# Patient Record
Sex: FEMALE | Race: WHITE | NOT HISPANIC OR LATINO | Employment: FULL TIME | URBAN - METROPOLITAN AREA
[De-identification: names, ages, dates, MRNs, and addresses within clinical notes are randomized per-mention and may not be internally consistent; named-entity substitution may affect disease eponyms.]

---

## 2016-02-29 LAB
CHOLEST SERPL-MCNC: 210 MG/DL
GLUCOSE SERPL-MCNC: 88 MG/DL (ref 74–106)
HDLC SERPL-MCNC: 57 MG/DL
LDLC SERPL CALC-MCNC: 134 MG/DL
TRIGL SERPL-MCNC: 95 MG/DL

## 2018-03-02 ENCOUNTER — TRANSFERRED RECORDS (OUTPATIENT)
Dept: HEALTH INFORMATION MANAGEMENT | Facility: CLINIC | Age: 38
End: 2018-03-02

## 2018-03-02 LAB
CHOLEST SERPL-MCNC: 266 MG/DL
GLUCOSE SERPL-MCNC: 91 MG/DL (ref 50–100)
HDLC SERPL-MCNC: 52 MG/DL
LDLC SERPL CALC-MCNC: 195 MG/DL
TRIGL SERPL-MCNC: 95 MG/DL

## 2018-03-21 ENCOUNTER — OFFICE VISIT (OUTPATIENT)
Dept: INTERNAL MEDICINE | Facility: CLINIC | Age: 38
End: 2018-03-21
Payer: COMMERCIAL

## 2018-03-21 VITALS
BODY MASS INDEX: 27.56 KG/M2 | DIASTOLIC BLOOD PRESSURE: 70 MMHG | SYSTOLIC BLOOD PRESSURE: 90 MMHG | HEART RATE: 94 BPM | HEIGHT: 65 IN | TEMPERATURE: 98.2 F | OXYGEN SATURATION: 97 % | WEIGHT: 165.4 LBS

## 2018-03-21 DIAGNOSIS — G43.719 INTRACTABLE CHRONIC MIGRAINE WITHOUT AURA AND WITHOUT STATUS MIGRAINOSUS: ICD-10-CM

## 2018-03-21 DIAGNOSIS — E78.5 HYPERLIPIDEMIA LDL GOAL <130: Primary | ICD-10-CM

## 2018-03-21 DIAGNOSIS — K21.9 GASTROESOPHAGEAL REFLUX DISEASE WITHOUT ESOPHAGITIS: ICD-10-CM

## 2018-03-21 PROCEDURE — 36415 COLL VENOUS BLD VENIPUNCTURE: CPT | Performed by: INTERNAL MEDICINE

## 2018-03-21 PROCEDURE — 80061 LIPID PANEL: CPT | Performed by: INTERNAL MEDICINE

## 2018-03-21 PROCEDURE — 99214 OFFICE O/P EST MOD 30 MIN: CPT | Performed by: INTERNAL MEDICINE

## 2018-03-21 RX ORDER — SUMATRIPTAN 100 MG/1
100 TABLET, FILM COATED ORAL PRN
Qty: 30 TABLET | Refills: 0 | Status: SHIPPED | OUTPATIENT
Start: 2018-03-21 | End: 2023-12-11

## 2018-03-21 NOTE — MR AVS SNAPSHOT
After Visit Summary   3/21/2018    Chloe Magaña    MRN: [id number]           Patient Information     Date Of Birth          1980        Visit Information        Provider Department      3/21/2018 10:20 AM Deepali Gray MD [facility]        Today's Diagnoses     Hyperlipidemia LDL goal <130    -  1      Care Instructions    Nutritionist    Exercise 150 minutes per week.           Follow-ups after your visit        Additional Services     NUTRITION REFERRAL       Your provider has referred you to: [facility]: [facility] [phone]   [email or web address]    Please be aware that coverage of these services is subject to the terms and limitations of your health insurance plan.  Call member services at your health plan with any benefit or coverage questions.      Please bring the following with you to your appointment:    (1) This referral request  (2) Any documents given to you regarding this referral  (3) Any specific questions you have about diet and/or food choices                  Who to contact     If you have questions or need follow up information about today's clinic visit or your schedule please contact [facility] directly at [phone].  Normal or non-critical lab and imaging results will be communicated to you by MyChart, letter or phone within 4 business days after the clinic has received the results. If you do not hear from us within 7 days, please contact the clinic through Schedule Savvyhart or phone. If you have a critical or abnormal lab result, we will notify you by phone as soon as possible.  Submit refill requests through [facility] ([facility]) or call your pharmacy and they will forward the refill request to us. Please allow 3 business days for your refill to be completed.          Additional Information About Your Visit        Schedule Savvyhart Information     [facility] ([facility]) lets you send messages to your doctor, view your test  "results, renew your prescriptions, schedule appointments and more. To sign up, go to [email or web address] . Click on \"Log in\" on the left side of the screen, which will take you to the Welcome page. Then click on \"Sign up Now\" on the right side of the page.     You will be asked to enter the access code listed below, as well as some personal information. Please follow the directions to create your username and password.     Your access code is: [email or web address]  Expires: 2018 11:07 AM     Your access code will  in 90 days. If you need help or a new code, please call your [place] clinic or [phone].        Care EveryWhere ID     This is your Care EveryWhere ID. This could be used by other organizations to access your [place] medical records  [id number]        Your Vitals Were     Pulse Temperature Height Last Period Pulse Oximetry BMI (Body Mass Index)    94 98.2  F (36.8  C) (Oral) 5' 4.5\" (1.638 m) 2018 97% 27.95 kg/m2       Blood Pressure from Last 3 Encounters:   18 90/70    Weight from Last 3 Encounters:   18 165 lb 6.4 oz (75 kg)              We Performed the Following     NUTRITION REFERRAL        Primary Care Provider Office Phone # Fax #    Deepali Gray [phone]       [address] [place] [address]        Equal Access to Services     [facility] Hospital[facility]ENJI : Hadii aad ku hadasho Soomaali, waaxda luqadaha, qaybta kaalmada adeegyada, jessi alcaraz . So [facility] [phone].    ATENCIÓN: Si habla español, tiene a simon disposición servicios gratuitos de asistencia lingüística. Avani almazan [phone].    We comply with applicable federal civil rights laws and [place] laws. We do not discriminate on the basis of race, color, national origin, age, disability, sex, sexual orientation, or gender identity.            Thank you!     Thank you for choosing [facility]  for your care. Our goal is always to provide you " with excellent care. Hearing back from our patients is one way we can continue to improve our services. Please take a few minutes to complete the written survey that you may receive in the mail after your visit with us. Thank you!             Your Updated Medication List - Protect others around you: Learn how to safely use, store and throw away your medicines at www.disposemymeds.org.          This list is accurate as of 3/21/18 11:07 AM.  Always use your most recent med list.                   Brand Name Dispense Instructions for use Diagnosis    IMITREX PO      Take 100 mg by mouth as needed for migraine

## 2018-03-21 NOTE — NURSING NOTE
"Chief Complaint   Patient presents with     Establish Care       Initial BP 90/70 (Cuff Size: Adult Large)  Pulse 94  Temp 98.2  F (36.8  C) (Oral)  Ht 5' 4.5\" (1.638 m)  Wt 165 lb 6.4 oz (75 kg)  LMP 03/20/2018  SpO2 97%  BMI 27.95 kg/m2 Estimated body mass index is 27.95 kg/(m^2) as calculated from the following:    Height as of this encounter: 5' 4.5\" (1.638 m).    Weight as of this encounter: 165 lb 6.4 oz (75 kg).  Medication Reconciliation: complete    "

## 2018-03-21 NOTE — Clinical Note
Please abstract the following data from this visit with this patient into the appropriate field in Epic:  Pap smear done on this date: 03/02/18 (approximately), by this group: [facility], results were WNL.

## 2018-03-21 NOTE — PROGRESS NOTES
"Pap smear done on this date: 03/02/18 (approximately), by this group: [facility], results were WNL.      SUBJECTIVE:   Chloe Magaña is a 38 year old female who presents to clinic today for the following health issues:    Hyperlipidemia.    She reports her diet is not the best.  She does not exercise.   LDL cholesterol was 195mg/dL 3/2/18.     Migraines.  The patient does not have migraine without aura. She has migraines every 2 months around the time of her period.   She has an IUD.     Heartburn for 2-3 years.  She reports that it is not consistent.   She took prilosec every other day.   Past 1 month she has had it every other day.   Yesterday she had chest pain after dinner for 2 minutes.  She was sitting on the couch.       Problem list and histories reviewed & adjusted, as indicated.      Reviewed and updated as needed this visit by clinical staff  Allergies  Meds  Problems       Reviewed and updated as needed this visit by Provider         ROS:  CONSTITUTIONAL: NEGATIVE for fever, chills, change in weight  RESP: NEGATIVE for significant cough or SOB  CV: NEGATIVE for chest pain, palpitations or peripheral edema  GI: POS heartburn  Neuro: migraines    OBJECTIVE:     BP 90/70 (Cuff Size: Adult Large)  Pulse 94  Temp 98.2  F (36.8  C) (Oral)  Ht 5' 4.5\" (1.638 m)  Wt 165 lb 6.4 oz (75 kg)  LMP 03/20/2018  SpO2 97%  BMI 27.95 kg/m2  Body mass index is 27.95 kg/(m^2).  GENERAL: healthy, alert and no distress  RESP: lungs clear to auscultation - no rales, rhonchi or wheezes  CV: regular rate and rhythm, normal S1 S2, no S3 or S4, no murmur, click or rub  GI: no epigastric tenderness    ASSESSMENT/PLAN:       (E78.5) Hyperlipidemia LDL goal <130 (primary encounter diagnosis)  Comment: reassess  Plan: NUTRITION REFERRAL, Lipid panel reflex to         direct LDL Fasting            (G43.899) Intractable chronic migraine without aura and without status migrainosus    Comment:   Plan: SUMAtriptan (IMITREX) " 100 MG tablet            (K21.9) Gastroesophageal reflux disease without esophagitis  Comment:  Plan: counseled on diet        Deepali Gray MD  [facility]

## 2018-03-22 LAB
CHOLEST SERPL-MCNC: 215 MG/DL
HDLC SERPL-MCNC: 43 MG/DL
LDLC SERPL CALC-MCNC: 156 MG/DL
NONHDLC SERPL-MCNC: 172 MG/DL
TRIGL SERPL-MCNC: 81 MG/DL

## 2018-03-26 ENCOUNTER — HEALTH MAINTENANCE LETTER (OUTPATIENT)
Age: 38
End: 2018-03-26

## 2018-10-23 ENCOUNTER — TELEPHONE (OUTPATIENT)
Dept: INTERNAL MEDICINE | Facility: CLINIC | Age: 38
End: 2018-10-23

## 2018-10-23 NOTE — LETTER
[facility]  [address], [address]  [place], [place]  [address]                                            TEL:[phone]  FAX:[phone]      Chloe Magaña  [address] [address] [facility] [place] [address]      November 6, 2018    Dear Chloe,         We sent you a letter a couple of weeks ago informing you of health maintenance that is due. We hope that you received it. This letter is just a follow up to remind you to schedule an appointment.            Sincerely,      Deepali Gray M.D.

## 2018-10-23 NOTE — LETTER
[facility]  [address], [address]  [place], [place]  [address]                                            TEL:[phone]  FAX:[phone]      Chloe Magaña  [address] [address] [facility] [address] [facility] [place] [address]      October 23, 2018    Dear Chloe,         At [facility] we care about your health and well-being.  A review of your chart has indicated that you are due for a PAP screening.  Please contact us at [phone] to schedule an appointment.    If you have already had one or all of the above screening tests at another facility, please call us to update your chart.       Sincerely,      Deepali Gray M.D.

## 2018-10-23 NOTE — TELEPHONE ENCOUNTER
Panel Management Review      Patient has the following on her problem list:       IVD   ASA: MONITOR    Last LDL:    Lab Results   Component Value Date    CHOL 215 03/21/2018     Lab Results   Component Value Date    HDL 43 03/21/2018     Lab Results   Component Value Date     03/21/2018     Lab Results   Component Value Date    TRIG 81 03/21/2018      No results found for: CHOLHDLRATIO     Is the patient on a Statin? NO   Is the patient on Aspirin? NO                    Last three blood pressure readings:  BP Readings from Last 3 Encounters:   03/21/18 90/70        Tobacco History:     History   Smoking Status     Never Smoker   Smokeless Tobacco     Never Used         Composite cancer screening  Chart review shows that this patient is due/due soon for the following Pap Smear  Summary:    Patient is due/failing the following:   PAP    Action needed:   Patient needs office visit for PAP screening.    Type of outreach:    Sent letter.    Questions for provider review:    None                                                                                                                                    Valarie POLLACK       Chart routed to Valarie POLLACK

## 2019-03-08 ENCOUNTER — TRANSFERRED RECORDS (OUTPATIENT)
Dept: HEALTH INFORMATION MANAGEMENT | Facility: CLINIC | Age: 39
End: 2019-03-08

## 2019-03-08 LAB
CHOLEST SERPL-MCNC: 231 MG/DL
GLUCOSE SERPL-MCNC: 79 MG/DL (ref 50–100)
HDLC SERPL-MCNC: 55 MG/DL
LDLC SERPL CALC-MCNC: 166 MG/DL
TRIGL SERPL-MCNC: 50 MG/DL

## 2019-03-27 ENCOUNTER — OFFICE VISIT (OUTPATIENT)
Dept: INTERNAL MEDICINE | Facility: CLINIC | Age: 39
End: 2019-03-27
Payer: COMMERCIAL

## 2019-03-27 VITALS
OXYGEN SATURATION: 99 % | WEIGHT: 126.3 LBS | DIASTOLIC BLOOD PRESSURE: 62 MMHG | HEIGHT: 65 IN | TEMPERATURE: 97.8 F | BODY MASS INDEX: 21.04 KG/M2 | SYSTOLIC BLOOD PRESSURE: 104 MMHG | HEART RATE: 91 BPM

## 2019-03-27 DIAGNOSIS — Z00.00 ROUTINE GENERAL MEDICAL EXAMINATION AT A HEALTH CARE FACILITY: Primary | ICD-10-CM

## 2019-03-27 LAB
BASOPHILS # BLD AUTO: 0 10E9/L (ref 0–0.2)
BASOPHILS NFR BLD AUTO: 0.4 %
DIFFERENTIAL METHOD BLD: NORMAL
EOSINOPHIL # BLD AUTO: 0.1 10E9/L (ref 0–0.7)
EOSINOPHIL NFR BLD AUTO: 0.6 %
ERYTHROCYTE [DISTWIDTH] IN BLOOD BY AUTOMATED COUNT: 12.9 % (ref 10–15)
HCT VFR BLD AUTO: 42.8 % (ref 35–47)
HGB BLD-MCNC: 13.9 G/DL (ref 11.7–15.7)
LYMPHOCYTES # BLD AUTO: 2.5 10E9/L (ref 0.8–5.3)
LYMPHOCYTES NFR BLD AUTO: 31.6 %
MCH RBC QN AUTO: 31.1 PG (ref 26.5–33)
MCHC RBC AUTO-ENTMCNC: 32.5 G/DL (ref 31.5–36.5)
MCV RBC AUTO: 96 FL (ref 78–100)
MONOCYTES # BLD AUTO: 0.5 10E9/L (ref 0–1.3)
MONOCYTES NFR BLD AUTO: 6.4 %
NEUTROPHILS # BLD AUTO: 4.8 10E9/L (ref 1.6–8.3)
NEUTROPHILS NFR BLD AUTO: 61 %
PLATELET # BLD AUTO: 278 10E9/L (ref 150–450)
RBC # BLD AUTO: 4.47 10E12/L (ref 3.8–5.2)
WBC # BLD AUTO: 7.8 10E9/L (ref 4–11)

## 2019-03-27 PROCEDURE — 84443 ASSAY THYROID STIM HORMONE: CPT | Performed by: INTERNAL MEDICINE

## 2019-03-27 PROCEDURE — 36415 COLL VENOUS BLD VENIPUNCTURE: CPT | Performed by: INTERNAL MEDICINE

## 2019-03-27 PROCEDURE — 85025 COMPLETE CBC W/AUTO DIFF WBC: CPT | Performed by: INTERNAL MEDICINE

## 2019-03-27 PROCEDURE — 99395 PREV VISIT EST AGE 18-39: CPT | Performed by: INTERNAL MEDICINE

## 2019-03-27 PROCEDURE — 80053 COMPREHEN METABOLIC PANEL: CPT | Performed by: INTERNAL MEDICINE

## 2019-03-27 PROCEDURE — 80061 LIPID PANEL: CPT | Performed by: INTERNAL MEDICINE

## 2019-03-27 ASSESSMENT — ENCOUNTER SYMPTOMS
DIZZINESS: 0
WEAKNESS: 0
JOINT SWELLING: 0
EYE PAIN: 0
HEMATOCHEZIA: 0
PALPITATIONS: 0
ARTHRALGIAS: 0
SHORTNESS OF BREATH: 0
CONSTIPATION: 0
ABDOMINAL PAIN: 1
CHILLS: 0
DIARRHEA: 1
FREQUENCY: 0
DYSURIA: 0
NERVOUS/ANXIOUS: 0
HEADACHES: 0
FEVER: 0
MYALGIAS: 0
NAUSEA: 0
HEMATURIA: 0
BREAST MASS: 0
HEARTBURN: 0
PARESTHESIAS: 0
COUGH: 0
SORE THROAT: 0

## 2019-03-27 ASSESSMENT — MIFFLIN-ST. JEOR: SCORE: 1254.8

## 2019-03-27 NOTE — PROGRESS NOTES
SUBJECTIVE:   CC: Chloe Magaña is an 39 year old woman who presents for preventive health visit.     Physical   Annual:     Getting at least 3 servings of Calcium per day:  Yes    Bi-annual eye exam:  NO    Dental care twice a year:  Yes    Sleep apnea or symptoms of sleep apnea:  None    Diet:  Regular (no restrictions)    Frequency of exercise:  None    Taking medications regularly:  Not Applicable    Additional concerns today:  Yes    PHQ-2 Total Score: 0      .     Today's PHQ-2 Score:   PHQ-2 ( 1999 Pfizer) 3/27/2019   Q1: Little interest or pleasure in doing things 0   Q2: Feeling down, depressed or hopeless 0   PHQ-2 Score 0   Q1: Little interest or pleasure in doing things Not at all   Q2: Feeling down, depressed or hopeless Not at all   PHQ-2 Score 0       Abuse: Current or Past(Physical, Sexual or Emotional)- No  Do you feel safe in your environment? Yes    Social History     Tobacco Use     Smoking status: Never Smoker     Smokeless tobacco: Never Used   Substance Use Topics     Alcohol use: Yes     Comment: rarely     Alcohol Use 3/27/2019   If you drink alcohol do you typically have greater than 3 drinks per day OR greater than 7 drinks per week? No     Reviewed orders with patient.  Reviewed health maintenance and updated orders accordingly - Yes  Labs reviewed in EPIC    Mammogram not appropriate for this patient based on age.    Pertinent mammograms are reviewed under the imaging tab.  History of abnormal Pap smear: NO - age 30- 65 PAP every 3 years recommended     Reviewed and updated as needed this visit by clinical staff  Tobacco  Allergies  Meds  Fam Hx  Soc Hx        Reviewed and updated as needed this visit by Provider        Positive weight loss of 40 lbs- intentional with discontinuing soda and alcohol and decreasing sugar.  Also taking more stairs at work, etc.    Review of Systems   Constitutional: Negative for chills and fever.   HENT: Positive for congestion and ear pain.  "Negative for hearing loss and sore throat.    Eyes: Negative for pain and visual disturbance.   Respiratory: Negative for cough and shortness of breath.    Cardiovascular: Negative for chest pain, palpitations and peripheral edema.   Gastrointestinal: Positive for abdominal pain and diarrhea. Negative for constipation, heartburn, hematochezia and nausea.   Breasts:  Negative for tenderness, breast mass and discharge.   Genitourinary: Negative for dysuria, frequency, genital sores, hematuria, pelvic pain, urgency, vaginal bleeding and vaginal discharge.   Musculoskeletal: Negative for arthralgias, joint swelling and myalgias.   Skin: Negative for rash.   Neurological: Negative for dizziness, weakness, headaches and paresthesias.   Psychiatric/Behavioral: Negative for mood changes. The patient is not nervous/anxious.    pt plans to make separate appt if LUQ under rib pain does not improve. She plans on heat and ice and light stretches. Of note, she has a h/o kyphosis     OBJECTIVE:   /62   Pulse 91   Temp 97.8  F (36.6  C) (Oral)   Ht 1.645 m (5' 4.75\")   Wt 57.3 kg (126 lb 4.8 oz)   SpO2 99%   BMI 21.18 kg/m       Physical Exam  GENERAL APPEARANCE: healthy, alert and no distress  EYES: Eyes grossly normal to inspection, PERRL and conjunctivae and sclerae normal  HENT: ear canals and TM's normal, nose and mouth without ulcers or lesions, oropharynx clear and oral mucous membranes moist  NECK: no adenopathy, no asymmetry, masses, or scars and thyroid normal to palpation  RESP: lungs clear to auscultation - no rales, rhonchi or wheezes  BREAST: normal without masses, tenderness or nipple discharge and no palpable axillary masses or adenopathy  CV: regular rate and rhythm, normal S1 S2, no S3 or S4, no murmur, click or rub, no peripheral edema and peripheral pulses strong  ABDOMEN: soft, tenderness in left upper quadrant without guarding or rebound, no hepatosplenomegaly, no masses and bowel sounds " "normal  MS: no musculoskeletal defects are noted and gait is age appropriate without ataxia  SKIN: no suspicious lesions or rashes  NEURO: Normal strength and tone, sensory exam grossly normal, mentation intact and speech normal  PSYCH: mentation appears normal and affect normal/bright        ASSESSMENT/PLAN:       ICD-10-CM    1. Routine general medical examination at a health care facility Z00.00 TSH with free T4 reflex     Comprehensive metabolic panel     Lipid panel reflex to direct LDL Fasting     CBC with platelets differential       COUNSELING:  Reviewed preventive health counseling, as reflected in patient instructions       Regular exercise       Healthy diet/nutrition    BP Readings from Last 1 Encounters:   03/27/19 104/62     Estimated body mass index is 21.18 kg/m  as calculated from the following:    Height as of this encounter: 1.645 m (5' 4.75\").    Weight as of this encounter: 57.3 kg (126 lb 4.8 oz).           reports that  has never smoked. she has never used smokeless tobacco.      Counseling Resources:  ATP IV Guidelines  Pooled Cohorts Equation Calculator  Breast Cancer Risk Calculator  FRAX Risk Assessment  ICSI Preventive Guidelines  Dietary Guidelines for Americans, 2010  USDA's MyPlate  ASA Prophylaxis  Lung CA Screening    Deepali Gray MD  [facility]  "

## 2019-03-27 NOTE — LETTER
March 29, 2019      Chloe Magaña  [address] [address] [facility] [place] [address]        Dear ,    We are writing to inform you of your test results.    The cholesterol level LDL is elevated.     Recommendations are for a healthy weight (BMI of 20-25), healthy diet (please let us know if you would like to see a nutritionist), and exercise (150 minutes per week).     The kidney function (GFR) and liver function tests (AST and ALT) are within normal limits.     The fasting glucose is within normal limits.   The thyroid test (TSH) is within normal limits.   The blood counts (CBC) are within normal limits.     Resulted Orders   TSH with free T4 reflex   Result Value Ref Range    TSH 1.40 0.40 - 4.00 mU/L   Comprehensive metabolic panel   Result Value Ref Range    Sodium 140 133 - 144 mmol/L    Potassium 4.5 3.4 - 5.3 mmol/L    Chloride 110 (H) 94 - 109 mmol/L    Carbon Dioxide 24 20 - 32 mmol/L    Anion Gap 6 3 - 14 mmol/L    Glucose 89 70 - 99 mg/dL      Comment:      Fasting specimen    Urea Nitrogen 11 7 - 30 mg/dL    Creatinine 0.60 0.52 - 1.04 mg/dL    GFR Estimate >90 >60 mL/min/[1.73_m2]      Comment:      Non  GFR Calc  Starting 12/18/2018, serum creatinine based estimated GFR (eGFR) will be   calculated using the Chronic Kidney Disease Epidemiology Collaboration   (CKD-EPI) equation.      GFR Estimate If Black >90 >60 mL/min/[1.73_m2]      Comment:       GFR Calc  Starting 12/18/2018, serum creatinine based estimated GFR (eGFR) will be   calculated using the Chronic Kidney Disease Epidemiology Collaboration   (CKD-EPI) equation.      Calcium 9.4 8.5 - 10.1 mg/dL    Bilirubin Total 0.5 0.2 - 1.3 mg/dL    Albumin 4.3 3.4 - 5.0 g/dL    Protein Total 7.8 6.8 - 8.8 g/dL    Alkaline Phosphatase 68 40 - 150 U/L    ALT 17 0 - 50 U/L    AST 14 0 - 45 U/L   Lipid panel reflex to direct LDL Fasting   Result Value Ref Range    Cholesterol 209 (H) <200 mg/dL      Comment:      Desirable:        <200 mg/dl    Triglycerides 57 <150 mg/dL      Comment:      Fasting specimen    HDL Cholesterol 48 (L) >49 mg/dL    LDL Cholesterol Calculated 150 (H) <100 mg/dL      Comment:      Above desirable:  100-129 mg/dl  Borderline High:  130-159 mg/dL  High:             160-189 mg/dL  Very high:       >189 mg/dl      Non HDL Cholesterol 161 (H) <130 mg/dL      Comment:      Above Desirable:  130-159 mg/dl  Borderline high:  160-189 mg/dl  High:             190-219 mg/dl  Very high:       >219 mg/dl     CBC with platelets differential   Result Value Ref Range    WBC 7.8 4.0 - 11.0 10e9/L    RBC Count 4.47 3.8 - 5.2 10e12/L    Hemoglobin 13.9 11.7 - 15.7 g/dL    Hematocrit 42.8 35.0 - 47.0 %    MCV 96 78 - 100 fl    MCH 31.1 26.5 - 33.0 pg    MCHC 32.5 31.5 - 36.5 g/dL    RDW 12.9 10.0 - 15.0 %    Platelet Count 278 150 - 450 10e9/L    % Neutrophils 61.0 %    % Lymphocytes 31.6 %    % Monocytes 6.4 %    % Eosinophils 0.6 %    % Basophils 0.4 %    Absolute Neutrophil 4.8 1.6 - 8.3 10e9/L    Absolute Lymphocytes 2.5 0.8 - 5.3 10e9/L    Absolute Monocytes 0.5 0.0 - 1.3 10e9/L    Absolute Eosinophils 0.1 0.0 - 0.7 10e9/L    Absolute Basophils 0.0 0.0 - 0.2 10e9/L    Diff Method Automated Method        If you have any questions or concerns, please call the clinic at the number listed above.       Sincerely,        Deepali Gray MD

## 2019-03-27 NOTE — NURSING NOTE
"/62   Pulse 91   Temp 97.8  F (36.6  C) (Oral)   Ht 1.645 m (5' 4.75\")   Wt 57.3 kg (126 lb 4.8 oz)   SpO2 99%   BMI 21.18 kg/m    Britany Benavides MA on 3/27/2019 at 9:02 AM    "

## 2019-03-28 LAB
ALBUMIN SERPL-MCNC: 4.3 G/DL (ref 3.4–5)
ALP SERPL-CCNC: 68 U/L (ref 40–150)
ALT SERPL W P-5'-P-CCNC: 17 U/L (ref 0–50)
ANION GAP SERPL CALCULATED.3IONS-SCNC: 6 MMOL/L (ref 3–14)
AST SERPL W P-5'-P-CCNC: 14 U/L (ref 0–45)
BILIRUB SERPL-MCNC: 0.5 MG/DL (ref 0.2–1.3)
BUN SERPL-MCNC: 11 MG/DL (ref 7–30)
CALCIUM SERPL-MCNC: 9.4 MG/DL (ref 8.5–10.1)
CHLORIDE SERPL-SCNC: 110 MMOL/L (ref 94–109)
CHOLEST SERPL-MCNC: 209 MG/DL
CO2 SERPL-SCNC: 24 MMOL/L (ref 20–32)
CREAT SERPL-MCNC: 0.6 MG/DL (ref 0.52–1.04)
GFR SERPL CREATININE-BSD FRML MDRD: >90 ML/MIN/{1.73_M2}
GLUCOSE SERPL-MCNC: 89 MG/DL (ref 70–99)
HDLC SERPL-MCNC: 48 MG/DL
LDLC SERPL CALC-MCNC: 150 MG/DL
NONHDLC SERPL-MCNC: 161 MG/DL
POTASSIUM SERPL-SCNC: 4.5 MMOL/L (ref 3.4–5.3)
PROT SERPL-MCNC: 7.8 G/DL (ref 6.8–8.8)
SODIUM SERPL-SCNC: 140 MMOL/L (ref 133–144)
TRIGL SERPL-MCNC: 57 MG/DL
TSH SERPL DL<=0.005 MIU/L-ACNC: 1.4 MU/L (ref 0.4–4)

## 2019-04-01 ENCOUNTER — TELEPHONE (OUTPATIENT)
Dept: INTERNAL MEDICINE | Facility: CLINIC | Age: 39
End: 2019-04-01

## 2019-04-01 NOTE — TELEPHONE ENCOUNTER
Letter mailed to patient on 3/29/19.   Called patient, informed her letter was mailed with results and reviewed results with patient. Patient verbalizes understanding, she has been working on healthy diet, weight loss and exercise - lost 40 lbs in the last year. No further questions.

## 2019-04-04 ENCOUNTER — TRANSFERRED RECORDS (OUTPATIENT)
Dept: HEALTH INFORMATION MANAGEMENT | Facility: CLINIC | Age: 39
End: 2019-04-04

## 2020-03-11 ENCOUNTER — HEALTH MAINTENANCE LETTER (OUTPATIENT)
Age: 40
End: 2020-03-11

## 2020-05-29 ENCOUNTER — TRANSFERRED RECORDS (OUTPATIENT)
Dept: HEALTH INFORMATION MANAGEMENT | Facility: CLINIC | Age: 40
End: 2020-05-29

## 2020-05-29 LAB
CHOLEST SERPL-MCNC: 255 MG/DL
HDLC SERPL-MCNC: 66 MG/DL
LDLC SERPL CALC-MCNC: 177 MG/DL
TRIGL SERPL-MCNC: 62 MG/DL

## 2020-06-26 ENCOUNTER — TRANSFERRED RECORDS (OUTPATIENT)
Dept: HEALTH INFORMATION MANAGEMENT | Facility: CLINIC | Age: 40
End: 2020-06-26

## 2021-01-03 ENCOUNTER — HEALTH MAINTENANCE LETTER (OUTPATIENT)
Age: 41
End: 2021-01-03

## 2021-02-02 ENCOUNTER — TRANSFERRED RECORDS (OUTPATIENT)
Dept: HEALTH INFORMATION MANAGEMENT | Facility: CLINIC | Age: 41
End: 2021-02-02

## 2021-10-10 ENCOUNTER — HEALTH MAINTENANCE LETTER (OUTPATIENT)
Age: 41
End: 2021-10-10

## 2022-01-28 ENCOUNTER — TRANSFERRED RECORDS (OUTPATIENT)
Dept: HEALTH INFORMATION MANAGEMENT | Facility: CLINIC | Age: 42
End: 2022-01-28
Payer: COMMERCIAL

## 2022-01-29 ENCOUNTER — HEALTH MAINTENANCE LETTER (OUTPATIENT)
Age: 42
End: 2022-01-29

## 2022-02-08 ENCOUNTER — TRANSFERRED RECORDS (OUTPATIENT)
Dept: HEALTH INFORMATION MANAGEMENT | Facility: CLINIC | Age: 42
End: 2022-02-08
Payer: COMMERCIAL

## 2022-02-23 ENCOUNTER — HOSPITAL ENCOUNTER (OUTPATIENT)
Dept: MAMMOGRAPHY | Facility: CLINIC | Age: 42
Discharge: HOME | End: 2022-02-23
Attending: OBSTETRICS & GYNECOLOGY | Admitting: OBSTETRICS & GYNECOLOGY
Payer: COMMERCIAL

## 2022-02-23 DIAGNOSIS — Z12.31 VISIT FOR SCREENING MAMMOGRAM: ICD-10-CM

## 2022-02-23 PROCEDURE — 77067 SCR MAMMO BI INCL CAD: CPT

## 2022-09-18 ENCOUNTER — HEALTH MAINTENANCE LETTER (OUTPATIENT)
Age: 42
End: 2022-09-18

## 2023-02-07 ENCOUNTER — MEDICAL CORRESPONDENCE (OUTPATIENT)
Dept: HEALTH INFORMATION MANAGEMENT | Facility: CLINIC | Age: 43
End: 2023-02-07
Payer: COMMERCIAL

## 2023-02-07 ENCOUNTER — TRANSFERRED RECORDS (OUTPATIENT)
Dept: HEALTH INFORMATION MANAGEMENT | Facility: CLINIC | Age: 43
End: 2023-02-07
Payer: COMMERCIAL

## 2023-02-08 ENCOUNTER — TRANSCRIBE ORDERS (OUTPATIENT)
Dept: OTHER | Age: 43
End: 2023-02-08

## 2023-02-08 DIAGNOSIS — G43.829 MENSTRUAL MIGRAINE, NOT INTRACTABLE, WITHOUT STATUS MIGRAINOSUS: Primary | ICD-10-CM

## 2023-05-03 ENCOUNTER — LAB REQUISITION (OUTPATIENT)
Dept: LAB | Facility: CLINIC | Age: 43
End: 2023-05-03
Payer: COMMERCIAL

## 2023-05-03 DIAGNOSIS — R63.5 ABNORMAL WEIGHT GAIN: ICD-10-CM

## 2023-05-03 DIAGNOSIS — Z13.1 ENCOUNTER FOR SCREENING FOR DIABETES MELLITUS: ICD-10-CM

## 2023-05-03 DIAGNOSIS — Z13.220 ENCOUNTER FOR SCREENING FOR LIPOID DISORDERS: ICD-10-CM

## 2023-05-03 LAB
CHOLEST SERPL-MCNC: 252 MG/DL
FASTING STATUS PATIENT QL REPORTED: YES
GLUCOSE SERPL-MCNC: 92 MG/DL (ref 70–99)
HDLC SERPL-MCNC: 57 MG/DL
LDLC SERPL CALC-MCNC: 178 MG/DL
NONHDLC SERPL-MCNC: 195 MG/DL
TRIGL SERPL-MCNC: 86 MG/DL
TSH SERPL DL<=0.005 MIU/L-ACNC: 1.78 UIU/ML (ref 0.3–4.2)

## 2023-05-03 PROCEDURE — 80061 LIPID PANEL: CPT | Mod: ORL | Performed by: OBSTETRICS & GYNECOLOGY

## 2023-05-03 PROCEDURE — 84443 ASSAY THYROID STIM HORMONE: CPT | Mod: ORL | Performed by: OBSTETRICS & GYNECOLOGY

## 2023-05-03 PROCEDURE — 82947 ASSAY GLUCOSE BLOOD QUANT: CPT | Mod: ORL | Performed by: OBSTETRICS & GYNECOLOGY

## 2023-05-06 ENCOUNTER — HEALTH MAINTENANCE LETTER (OUTPATIENT)
Age: 43
End: 2023-05-06

## 2023-06-29 ASSESSMENT — HEADACHE IMPACT TEST (HIT 6)
HOW OFTEN HAVE YOU FELT FED UP OR IRRITATED BECAUSE OF YOUR HEADACHES: SOMETIMES
HOW OFTEN HAVE YOU FELT TOO TIRED TO WORK BECAUSE OF YOUR HEADACHES: SOMETIMES
WHEN YOU HAVE HEADACHES HOW OFTEN IS THE PAIN SEVERE: ALWAYS
HIT6 TOTAL SCORE: 64
HOW OFTEN DO HEADACHES LIMIT YOUR DAILY ACTIVITIES: SOMETIMES
WHEN YOU HAVE A HEADACHE HOW OFTEN DO YOU WISH YOU COULD LIE DOWN: VERY OFTEN
HOW OFTEN DID HEADACHS LIMIT CONCENTRATION ON WORK OR DAILY ACTIVITY: SOMETIMES

## 2023-07-06 ENCOUNTER — VIRTUAL VISIT (OUTPATIENT)
Dept: NEUROLOGY | Facility: CLINIC | Age: 43
End: 2023-07-06
Attending: OBSTETRICS & GYNECOLOGY
Payer: COMMERCIAL

## 2023-07-06 VITALS — BODY MASS INDEX: 25.61 KG/M2 | WEIGHT: 150 LBS | HEIGHT: 64 IN

## 2023-07-06 DIAGNOSIS — G43.829 MENSTRUAL MIGRAINE, NOT INTRACTABLE, WITHOUT STATUS MIGRAINOSUS: ICD-10-CM

## 2023-07-06 PROCEDURE — 99204 OFFICE O/P NEW MOD 45 MIN: CPT | Mod: VID | Performed by: PSYCHIATRY & NEUROLOGY

## 2023-07-06 RX ORDER — NARATRIPTAN 2.5 MG/1
2.5 TABLET ORAL
Qty: 18 TABLET | Refills: 11 | Status: SHIPPED | OUTPATIENT
Start: 2023-07-06 | End: 2023-12-11

## 2023-07-06 RX ORDER — NARATRIPTAN 2.5 MG/1
2.5 TABLET ORAL
Qty: 18 TABLET | Refills: 11 | Status: SHIPPED | OUTPATIENT
Start: 2023-07-06 | End: 2023-07-06

## 2023-07-06 ASSESSMENT — PAIN SCALES - GENERAL: PAINLEVEL: NO PAIN (0)

## 2023-07-06 NOTE — PROGRESS NOTES
Virtual Visit Details    Type of service:  Video Visit   Video Start Time: 2:05 PM  Video End Time:2:49 PM    Originating Location (pt. Location): Home  Distant Location (provider location):  On-site  Platform used for Video Visit: [facility]   Virtual Headache Neurology Consult  July 6, 2023     Chloe Magaña MRN# [id number]   YOB: 1980 Age: 43 year old     Requesting physician: Melinda Bruner MD         Assessment and Recommendations:     Chloe Magaña is a 43 year old female who presents for further evaluation of headache.    Her headache presentation meets criteria for episodic migraine without aura, possible menstrual migraine.  I suspect she has this on a genetic basis.  I did not appreciate any red flags for secondary causes of headache today.    Her virtual neurologic examination is intact.    We discussed several symptomatic treatment strategies, including continuing acute management only, trialing a week of preventative naratriptan, or starting a new preventative treatment.  -She will continue to monitor her headaches, including relationship to her menstrual cycle.  -I recommend naratriptan 2.5 mg taken at the onset of headache, with repeat dose in 4 hours if needed.  This should not exceed more than 9 days/month to avoid medication overuse.  She may take this daily during 1 week/month, during her menstrual period.  -For acute treatment of mild headache, she will continue naproxen 440 mg twice daily as needed.  This should not exceed more than 14 days/month to avoid medication overuse.    I will plan to see her back in 3 to 6 months to monitor progress.    Catalina Strickland MD  Neurology            Chief Complaint:     Chief Complaint   Patient presents with     Consult     Neurology Video Visit New, Menstrual migraine, not intractable, without status migrainosus           History is obtained from the patient and medical record.  Patient was seen via a virtual visit in their  home.      Chloe Magaña is a 43 year old female who has been living with headaches since 15 years ago. They have worsened over the past 1-2 years.    She has left periorbital swelling and frontal pain. This spreads to include more of her head now. It feels like a hard squeeze, throbbing. It rates 8/10. An attack lasts over 4 hours.    She has 5-8/30 headache days per month, with 3-4/30 severe headache days per month.    She has associated photophobia, phonophobia, nausea, rare vomiting.    She denies typical aura. She denies a positional component to her pain. She has unilateral autonomic features, with left eye swelling, tearing at times. She denies fevers.    Triggers include menstrual cycles (typically a bad week starting 2 days before or after period).     She has an IUD. She's had this for a few years. She tried continuous birth control previously.    She takes naproxen 440 mg for mild headache.   She takes naratriptan, which helps.   Laying down with a wash cloth over her eyes and sleeping helps.    Sumatriptan in the past - side effect of throat side effect            Past Medical History:     Past Medical History:   Diagnosis Date     Common migraine    Frontal fibrosis alopecia   Hyperlipidemia   Kyphosis - previous back brace in high school          Past Surgical History:   [place] teeth          Social History:   She is working full time as an . She is not missing work due to migraine.    She denies caffeine.    Social History     Socioeconomic History     Marital status:      Spouse name: Not on file     Number of children: Not on file     Years of education: Not on file     Highest education level: Not on file   Occupational History     Not on file   Tobacco Use     Smoking status: Never     Smokeless tobacco: Never   Substance and Sexual Activity     Alcohol use: Yes     Comment: rarely     Drug use: No     Sexual activity: Yes     Partners: Male     Birth  "control/protection: I.U.D.   Other Topics Concern     Parent/sibling w/ CABG, MI or angioplasty before 65F 55M? Not Asked   Social History Narrative     Not on file     Social Determinants of Health     Financial Resource Strain: Not on file   Food Insecurity: Not on file   Transportation Needs: Not on file   Physical Activity: Not on file   Stress: Not on file   Social Connections: Not on file   Intimate Partner Violence: Not on file   Housing Stability: Not on file             Family History:   Dad with migraine.    Family History   Problem Relation Age of Onset     Family History Negative Mother      Family History Negative Father      Ovarian Cancer Maternal Grandmother      Breast Cancer No family hx of              Allergies:    No Known Allergies          Medications:     Current Outpatient Medications:      SUMAtriptan (IMITREX) 100 MG tablet, Take 1 tablet (100 mg) by mouth as needed for migraine (Patient not taking: Reported on 3/27/2019), Disp: 30 tablet, Rfl: 0          Physical Exam:   Ht 1.632 m (5' 4.25\")   Wt 68 kg (150 lb)   BMI 25.55 kg/m     Physical Exam:   General: NAD  Neurologic:   Mental Status Exam: Alert, awake and oriented to situation. No dysarthria. Speech of normal fluency.   Cranial Nerves: Pupils equal, EOMs intact, no nystagmus, facial movements symmetric, hearing intact to conversation, tongue midline and fully mobile. No tongue atrophy or fasciculations.    Motor: No drift in upper extremities. Able to stand from a seated position without use of arms. No tremors or abnormal movements noted.   Coordination: With arms outstretched, able to touch nose using index finger accurately bilaterally.  Normal finger tapping bilaterally.     Gait: Normal stance and casual gait.    Neck: Normal range of motion with lateral head movements and neck flexion.  Eyes: No conjunctival injection, no scleral icterus.           Data:     No previous head imaging.    Up to date on eye exams.    "

## 2023-07-06 NOTE — NURSING NOTE
Is the patient currently in the state of [place]? YES    Visit mode:VIDEO    If the visit is dropped, the patient can be reconnected by: VIDEO VISIT: Send to e-mail at: [email or web address]    Will anyone else be joining the visit? NO      How would you like to obtain your AVS? MyChart    Are changes needed to the allergy or medication list? NO  Patient verified medications and allergies are correct via eCheck-in. Patient confirms no changes at this time and/or since last reviewed by clinic staff.    Reason for visit: Consult (Neurology Video Visit New, Menstrual migraine, not intractable, without status migrainosus)

## 2023-07-06 NOTE — LETTER
7/6/2023         RE: Chloe Magaña  [address] [address] [facility] [place] [address]        Dear Colleague,    Thank you for referring your patient, Chloe Magaña, to the [facility]. Please see a copy of my visit note below.    Virtual Visit Details    Type of service:  Video Visit   Video Start Time: 2:05 PM  Video End Time:2:49 PM    Originating Location (pt. Location): Home  Distant Location (provider location):  On-site  Platform used for Video Visit: [facility]   Virtual Headache Neurology Consult  July 6, 2023     Chloe Magaña MRN# [id number]   YOB: 1980 Age: 43 year old     Requesting physician: Melinda Bruner MD         Assessment and Recommendations:     Chloe Magaña is a 43 year old female who presents for further evaluation of headache.    Her headache presentation meets criteria for episodic migraine without aura, possible menstrual migraine.  I suspect she has this on a genetic basis.  I did not appreciate any red flags for secondary causes of headache today.    Her virtual neurologic examination is intact.    We discussed several symptomatic treatment strategies, including continuing acute management only, trialing a week of preventative naratriptan, or starting a new preventative treatment.  -She will continue to monitor her headaches, including relationship to her menstrual cycle.  -I recommend naratriptan 2.5 mg taken at the onset of headache, with repeat dose in 4 hours if needed.  This should not exceed more than 9 days/month to avoid medication overuse.  She may take this daily during 1 week/month, during her menstrual period.  -For acute treatment of mild headache, she will continue naproxen 440 mg twice daily as needed.  This should not exceed more than 14 days/month to avoid medication overuse.    I will plan to see her back in 3 to 6 months to monitor progress.    Catalina Strickland MD  Neurology            Chief Complaint:      Chief Complaint   Patient presents with     Consult     Neurology Video Visit New, Menstrual migraine, not intractable, without status migrainosus           History is obtained from the patient and medical record.  Patient was seen via a virtual visit in their home.      Chloe Magaña is a 43 year old female who has been living with headaches since 15 years ago. They have worsened over the past 1-2 years.    She has left periorbital swelling and frontal pain. This spreads to include more of her head now. It feels like a hard squeeze, throbbing. It rates 8/10. An attack lasts over 4 hours.    She has 5-8/30 headache days per month, with 3-4/30 severe headache days per month.    She has associated photophobia, phonophobia, nausea, rare vomiting.    She denies typical aura. She denies a positional component to her pain. She has unilateral autonomic features, with left eye swelling, tearing at times. She denies fevers.    Triggers include menstrual cycles (typically a bad week starting 2 days before or after period).     She has an IUD. She's had this for a few years. She tried continuous birth control previously.    She takes naproxen 440 mg for mild headache.   She takes naratriptan, which helps.   Laying down with a wash cloth over her eyes and sleeping helps.    Sumatriptan in the past - side effect of throat side effect            Past Medical History:     Past Medical History:   Diagnosis Date     Common migraine    Frontal fibrosis alopecia   Hyperlipidemia   Kyphosis - previous back brace in high school          Past Surgical History:   [place] teeth          Social History:   She is working full time as an . She is not missing work due to migraine.    She denies caffeine.    Social History     Socioeconomic History     Marital status:      Spouse name: Not on file     Number of children: Not on file     Years of education: Not on file     Highest education level: Not on file  "  Occupational History     Not on file   Tobacco Use     Smoking status: Never     Smokeless tobacco: Never   Substance and Sexual Activity     Alcohol use: Yes     Comment: rarely     Drug use: No     Sexual activity: Yes     Partners: Male     Birth control/protection: I.U.D.   Other Topics Concern     Parent/sibling w/ CABG, MI or angioplasty before 65F 55M? Not Asked   Social History Narrative     Not on file     Social Determinants of Health     Financial Resource Strain: Not on file   Food Insecurity: Not on file   Transportation Needs: Not on file   Physical Activity: Not on file   Stress: Not on file   Social Connections: Not on file   Intimate Partner Violence: Not on file   Housing Stability: Not on file             Family History:   Dad with migraine.    Family History   Problem Relation Age of Onset     Family History Negative Mother      Family History Negative Father      Ovarian Cancer Maternal Grandmother      Breast Cancer No family hx of              Allergies:    No Known Allergies          Medications:     Current Outpatient Medications:      SUMAtriptan (IMITREX) 100 MG tablet, Take 1 tablet (100 mg) by mouth as needed for migraine (Patient not taking: Reported on 3/27/2019), Disp: 30 tablet, Rfl: 0          Physical Exam:   Ht 1.632 m (5' 4.25\")   Wt 68 kg (150 lb)   BMI 25.55 kg/m     Physical Exam:   General: NAD  Neurologic:   Mental Status Exam: Alert, awake and oriented to situation. No dysarthria. Speech of normal fluency.   Cranial Nerves: Pupils equal, EOMs intact, no nystagmus, facial movements symmetric, hearing intact to conversation, tongue midline and fully mobile. No tongue atrophy or fasciculations.    Motor: No drift in upper extremities. Able to stand from a seated position without use of arms. No tremors or abnormal movements noted.   Coordination: With arms outstretched, able to touch nose using index finger accurately bilaterally.  Normal finger tapping bilaterally.  "    Gait: Normal stance and casual gait.    Neck: Normal range of motion with lateral head movements and neck flexion.  Eyes: No conjunctival injection, no scleral icterus.           Data:     No previous head imaging.    Up to date on eye exams.        Again, thank you for allowing me to participate in the care of your patient.        Sincerely,        Catalina Strickland MD

## 2023-12-08 ASSESSMENT — HEADACHE IMPACT TEST (HIT 6)
HIT6 TOTAL SCORE: 58
HOW OFTEN HAVE YOU FELT TOO TIRED TO WORK BECAUSE OF YOUR HEADACHES: RARELY
HOW OFTEN DID HEADACHS LIMIT CONCENTRATION ON WORK OR DAILY ACTIVITY: SOMETIMES
WHEN YOU HAVE A HEADACHE HOW OFTEN DO YOU WISH YOU COULD LIE DOWN: VERY OFTEN
WHEN YOU HAVE HEADACHES HOW OFTEN IS THE PAIN SEVERE: VERY OFTEN
HOW OFTEN HAVE YOU FELT FED UP OR IRRITATED BECAUSE OF YOUR HEADACHES: RARELY
HOW OFTEN DO HEADACHES LIMIT YOUR DAILY ACTIVITIES: SOMETIMES

## 2023-12-11 ENCOUNTER — OFFICE VISIT (OUTPATIENT)
Dept: NEUROLOGY | Facility: CLINIC | Age: 43
End: 2023-12-11
Payer: COMMERCIAL

## 2023-12-11 VITALS
HEART RATE: 85 BPM | SYSTOLIC BLOOD PRESSURE: 121 MMHG | WEIGHT: 162.4 LBS | OXYGEN SATURATION: 100 % | BODY MASS INDEX: 27.66 KG/M2 | DIASTOLIC BLOOD PRESSURE: 83 MMHG

## 2023-12-11 DIAGNOSIS — G43.829 MENSTRUAL MIGRAINE, NOT INTRACTABLE, WITHOUT STATUS MIGRAINOSUS: ICD-10-CM

## 2023-12-11 PROCEDURE — 99215 OFFICE O/P EST HI 40 MIN: CPT | Performed by: PSYCHIATRY & NEUROLOGY

## 2023-12-11 RX ORDER — NARATRIPTAN 2.5 MG/1
2.5 TABLET ORAL
Qty: 18 TABLET | Refills: 11 | Status: SHIPPED | OUTPATIENT
Start: 2023-12-11

## 2023-12-11 ASSESSMENT — PAIN SCALES - GENERAL: PAINLEVEL: MILD PAIN (2)

## 2023-12-11 NOTE — PROGRESS NOTES
[facility]    Headache Neurology Progress Note  December 11, 2023    Subjective:    Chloe Magaña returns for follow up of episodic migraine without aura, possible menstrual migraine.     In September, she took naratriptan x 5 days in a row. She found they helpful on the days she took them. She is having migraine days than previous years, she reports.    She reports an increase in headaches overall. Previously, they were more associated with menstrual cycles, now can happen other times too.   -She recorded 5 days in June, spread out throughout the month.  -She recorded 6 days in July, 2 days in August.    Previously, she described left periorbital swelling and frontal pain. This spreads to include more of her head now. It feels like a hard squeeze, throbbing. It rates 8/10. An attack lasts over 4 hours. She has 5-8/30 headache days per month, with 3-4/30 severe headache days per month. She has associated photophobia, phonophobia, nausea, rare vomiting.    Now, she is noticing headaches occurring in other areas as well. Now, it can also occur over the right side. She had nausea more recently as well. Otherwise, unchanged in quality.    She tried acupuncture for 3-4 months. She reports this was not helpful for headache.    Objective:    Vitals: /83 (BP Location: Right arm, Patient Position: Sitting, Cuff Size: Adult Regular)   Pulse 85   Wt 73.7 kg (162 lb 6.4 oz)   SpO2 100%   BMI 27.66 kg/m    General: Cooperative, NAD  Neurologic:  Mental Status: Fully alert, attentive and oriented. Speech clear and fluent.   Cranial Nerves: Vision intact to finger counting bilaterally.  Fundoscopic exam with clear disc margins bilaterally. Facial movements symmetric.  Normal shrug.  Motor: No abnormal movements.  Strength 5 out of 5.  Finger tapping intact bilaterally.  Reflexes: 2+ and symmetric throughout.  Coordination: Normal tandem gait.  Gait: Able to walk on toes and heels.  Normal  casual gait.    Assessment/Plan:   Chloe Magaña is a 43 year old woman who presents for follow up of episodic migraine without aura, possible menstrual migraine. They improved about 7 years ago, in the setting of IUD. Now, worsening again.  Funduscopic exam and neurologic exam reassuring today.  If headaches continue to worsen or do not respond to therapy, as we would expect, then we would consider additional workup for secondary causes.     We discussed several symptomatic treatment strategies, including continuing acute management only, or starting a new preventative treatment.  -I recommend she continue naratriptan 2.5 mg taken at the onset of headache, with repeat dose in 4 hours if needed.  This should not exceed more than 9 days/month to avoid medication overuse.    -For acute treatment of mild headache, she will continue naproxen 440 mg twice daily as needed.  This should not exceed more than 14 days/month to avoid medication overuse.    Her headache frequency and severity warrant prevention.  We discussed lifestyle behavioral measures that could be trialed, including supplements such as magnesium or riboflavin, Cefaly antimigraine device.   -I offered a trial of propranolol, topiramate, or amitriptyline.  She will consider these options and let me know if a prescription is needed.  I recommend a 3-month trial prior to determining effectiveness.    I will see her back in 1 year, or after a 3-month trial of a medication, if she decides to go this route.  I spent 42 minutes on record review, patient care, documentation today.    Catalina Strickland MD  Neurology

## 2023-12-11 NOTE — LETTER
12/11/2023       RE: Chloe Magaña  [address] [address] [facility] [place] [address]     Dear Colleague,    Thank you for referring your patient, Chloe Magaña, to the [facility] NEUROLOGY CLINIC [place] at [facility]. Please see a copy of my visit note below.    [facility]    Headache Neurology Progress Note  December 11, 2023    Subjective:    Chloe Magaña returns for follow up of episodic migraine without aura, possible menstrual migraine.     In September, she took naratriptan x 5 days in a row. She found they helpful on the days she took them. She is having migraine days than previous years, she reports.    She reports an increase in headaches overall. Previously, they were more associated with menstrual cycles, now can happen other times too.   -She recorded 5 days in June, spread out throughout the month.  -She recorded 6 days in July, 2 days in August.    Previously, she described left periorbital swelling and frontal pain. This spreads to include more of her head now. It feels like a hard squeeze, throbbing. It rates 8/10. An attack lasts over 4 hours. She has 5-8/30 headache days per month, with 3-4/30 severe headache days per month. She has associated photophobia, phonophobia, nausea, rare vomiting.    Now, she is noticing headaches occurring in other areas as well. Now, it can also occur over the right side. She had nausea more recently as well. Otherwise, unchanged in quality.    She tried acupuncture for 3-4 months. She reports this was not helpful for headache.    Objective:    Vitals: /83 (BP Location: Right arm, Patient Position: Sitting, Cuff Size: Adult Regular)   Pulse 85   Wt 73.7 kg (162 lb 6.4 oz)   SpO2 100%   BMI 27.66 kg/m    General: Cooperative, NAD  Neurologic:  Mental Status: Fully alert, attentive and oriented. Speech clear and fluent.   Cranial Nerves: Vision intact to finger counting  bilaterally.  Fundoscopic exam with clear disc margins bilaterally. Facial movements symmetric.  Normal shrug.  Motor: No abnormal movements.  Strength 5 out of 5.  Finger tapping intact bilaterally.  Reflexes: 2+ and symmetric throughout.  Coordination: Normal tandem gait.  Gait: Able to walk on toes and heels.  Normal casual gait.    Assessment/Plan:   Chloe Magaña is a 43 year old woman who presents for follow up of episodic migraine without aura, possible menstrual migraine. They improved about 7 years ago, in the setting of IUD. Now, worsening again.  Funduscopic exam and neurologic exam reassuring today.  If headaches continue to worsen or do not respond to therapy, as we would expect, then we would consider additional workup for secondary causes.     We discussed several symptomatic treatment strategies, including continuing acute management only, or starting a new preventative treatment.  -I recommend she continue naratriptan 2.5 mg taken at the onset of headache, with repeat dose in 4 hours if needed.  This should not exceed more than 9 days/month to avoid medication overuse.    -For acute treatment of mild headache, she will continue naproxen 440 mg twice daily as needed.  This should not exceed more than 14 days/month to avoid medication overuse.    Her headache frequency and severity warrant prevention.  We discussed lifestyle behavioral measures that could be trialed, including supplements such as magnesium or riboflavin, Cefaly antimigraine device.   -I offered a trial of propranolol, topiramate, or amitriptyline.  She will consider these options and let me know if a prescription is needed.  I recommend a 3-month trial prior to determining effectiveness.    I will see her back in 1 year, or after a 3-month trial of a medication, if she decides to go this route.  I spent 42 minutes on record review, patient care, documentation today.        Again, thank you for allowing me to participate in the care  of your patient.      Sincerely,    Catalina Strickland MD

## 2023-12-12 DIAGNOSIS — G43.829 MENSTRUAL MIGRAINE, NOT INTRACTABLE, WITHOUT STATUS MIGRAINOSUS: Primary | ICD-10-CM

## 2023-12-12 RX ORDER — AMITRIPTYLINE HYDROCHLORIDE 50 MG/1
50 TABLET ORAL AT BEDTIME
Qty: 30 TABLET | Refills: 3 | Status: SHIPPED | OUTPATIENT
Start: 2023-12-12 | End: 2024-03-27

## 2023-12-12 RX ORDER — AMITRIPTYLINE HYDROCHLORIDE 10 MG/1
TABLET ORAL
Qty: 66 TABLET | Refills: 0 | Status: SHIPPED | OUTPATIENT
Start: 2023-12-12 | End: 2024-01-08

## 2023-12-12 NOTE — PROGRESS NOTES
Sent in amitriptyline for titration by 10 mg each week, up to 50 mg if needed and tolerated. MD Courtney

## 2024-01-08 DIAGNOSIS — G43.829 MENSTRUAL MIGRAINE, NOT INTRACTABLE, WITHOUT STATUS MIGRAINOSUS: ICD-10-CM

## 2024-01-08 RX ORDER — AMITRIPTYLINE HYDROCHLORIDE 10 MG/1
TABLET ORAL
Qty: 8 TABLET | Refills: 0 | Status: SHIPPED | OUTPATIENT
Start: 2024-01-08 | End: 2024-03-27

## 2024-03-25 ASSESSMENT — HEADACHE IMPACT TEST (HIT 6)
HOW OFTEN HAVE YOU FELT TOO TIRED TO WORK BECAUSE OF YOUR HEADACHES: NEVER
HOW OFTEN HAVE YOU FELT FED UP OR IRRITATED BECAUSE OF YOUR HEADACHES: RARELY
WHEN YOU HAVE HEADACHES HOW OFTEN IS THE PAIN SEVERE: VERY OFTEN
HIT6 TOTAL SCORE: 56
WHEN YOU HAVE A HEADACHE HOW OFTEN DO YOU WISH YOU COULD LIE DOWN: ALWAYS
HOW OFTEN DO HEADACHES LIMIT YOUR DAILY ACTIVITIES: SOMETIMES
HOW OFTEN DID HEADACHS LIMIT CONCENTRATION ON WORK OR DAILY ACTIVITY: RARELY

## 2024-03-25 ASSESSMENT — MIGRAINE DISABILITY ASSESSMENT (MIDAS)
HOW MANY DAYS WAS YOUR PRODUCTIVITY CUT IN HALF BECAUSE OF HEADACHES: 10
HOW OFTEN WERE SOCIAL ACTIVITIES MISSED DUE TO HEADACHES: 1
HOW MANY DAYS DID YOU MISS WORK OR SCHOOL BECAUSE OF HEADACHES: 0
TOTAL SCORE: 23
ON A SCALE FROM 0-10 ON AVERAGE HOW PAINFUL WERE HEADACHES: 8
HOW MANY DAYS DID YOU NOT DO HOUSEWORK BECAUSE OF HEADACHES: 2
HOW MANY DAYS IN THE PAST 3 MONTHS HAVE YOU HAD A HEADACHE: 10
HOW MANY DAYS WAS HOUSEWORK PRODUCTIVITY CUT IN HALF DUE TO HEADACHES: 10

## 2024-03-27 ENCOUNTER — VIRTUAL VISIT (OUTPATIENT)
Dept: NEUROLOGY | Facility: CLINIC | Age: 44
End: 2024-03-27
Payer: COMMERCIAL

## 2024-03-27 VITALS — HEIGHT: 64 IN | WEIGHT: 156 LBS | BODY MASS INDEX: 26.63 KG/M2

## 2024-03-27 DIAGNOSIS — G43.829 MENSTRUAL MIGRAINE, NOT INTRACTABLE, WITHOUT STATUS MIGRAINOSUS: ICD-10-CM

## 2024-03-27 PROCEDURE — 99213 OFFICE O/P EST LOW 20 MIN: CPT | Mod: 95 | Performed by: PSYCHIATRY & NEUROLOGY

## 2024-03-27 RX ORDER — AMITRIPTYLINE HYDROCHLORIDE 50 MG/1
50 TABLET ORAL AT BEDTIME
Qty: 90 TABLET | Refills: 3 | Status: SHIPPED | OUTPATIENT
Start: 2024-03-27 | End: 2024-03-29

## 2024-03-27 ASSESSMENT — PAIN SCALES - GENERAL: PAINLEVEL: NO PAIN (0)

## 2024-03-27 NOTE — NURSING NOTE
Is the patient currently in the state of [place]? YES    Visit mode:VIDEO    If the visit is dropped, the patient can be reconnected by: VIDEO VISIT: Text to cell phone:   Telephone Information:   Mobile [phone]       Will anyone else be joining the visit? NO  (If patient encounters technical issues they should call [phone])    How would you like to obtain your AVS? MyChart    Are changes needed to the allergy or medication list? No    Reason for visit: Follow Up    Adela HAWKINS

## 2024-03-27 NOTE — LETTER
"3/27/2024       RE: Chloe Magaña  [address] [address] [facility] [address]       Dear Colleague,    Thank you for referring your patient, Chloe Magaña, to the [facility] NEUROLOGY CLINIC [place] at [facility]. Please see a copy of my visit note below.      [facility]    Headache Neurology Progress Note  March 27, 2024    Subjective:    Chloe Magaña returns for follow up of chronic migraine.    Today, she reports that she's been taking amitriptyline 50 mg since mid-January. She denies side effects. Initially, she had some morning grogginess.    Headache Diary:  3 migraine January 4 migraine February  3 migraine March    She continues naratriptan as needed. She tried taking it for menstrual cycle-triggered attacks, but didn't find she needed it and didn't want to use up her naratriptan.    She takes naproxen for mild headache or body pain.    She has a Cefaly to try from a friend. Hasn't tried it yet.    Objective:    Vitals: Ht 1.626 m (5' 4\")   Wt 70.8 kg (156 lb)   BMI 26.78 kg/m    General: Cooperative, NAD  Neurologic:  Mental Status: Fully alert, attentive and oriented. Speech clear and fluent.   Cranial Nerves: Facial movements symmetric.   Motor: No abnormal movements.      Assessment/Plan:   Chloe Magaña is a 44 year old woman who returns for follow up of episodic migraine without aura, possible menstrual migraine. Currently well controlled with amitriptyline 50 mg nightly, naratriptan as needed.     We discussed continuing amitriptyline 50 mg nightly for headache prevention.  -I recommend naratriptan 2.5 mg taken at the onset of headache, with repeat dose in 4 hours if needed.  This should not exceed more than 9 days/month to avoid medication overuse.  She may take this daily during 1 week/month, during her menstrual period.  -For acute treatment of mild headache, she will continue naproxen 440 mg twice daily " as needed.  This should not exceed more than 14 days/month to avoid medication overuse.    We discussed cost of naratriptan; it is more expensive with insurance. [employer] is helpful; I suggested [facility] as another option. She will let me know if a change in pharmacy is needed.    I will see her back in 1 year, sooner if needed.      Again, thank you for allowing me to participate in the care of your patient.      Sincerely,    Catalina Strickland MD

## 2024-03-27 NOTE — PROGRESS NOTES
"Virtual Visit Details    Type of service:  Video Visit     Originating Location (pt. Location): Home    Distant Location (provider location):  Off-site  Platform used for Video Visit: [facility]    Headache Neurology Progress Note  March 27, 2024    Subjective:    Chloe Magaña returns for follow up of chronic migraine.    Today, she reports that she's been taking amitriptyline 50 mg since mid-January. She denies side effects. Initially, she had some morning grogginess.    Headache Diary:  3 migraine January 4 migraine February  3 migraine March    She continues naratriptan as needed. She tried taking it for menstrual cycle-triggered attacks, but didn't find she needed it and didn't want to use up her naratriptan.    She takes naproxen for mild headache or body pain.    She has a Cefaly to try from a friend. Hasn't tried it yet.    Objective:    Vitals: Ht 1.626 m (5' 4\")   Wt 70.8 kg (156 lb)   BMI 26.78 kg/m    General: Cooperative, NAD  Neurologic:  Mental Status: Fully alert, attentive and oriented. Speech clear and fluent.   Cranial Nerves: Facial movements symmetric.   Motor: No abnormal movements.      Assessment/Plan:   Chloe Magaña is a 44 year old woman who returns for follow up of episodic migraine without aura, possible menstrual migraine. Currently well controlled with amitriptyline 50 mg nightly, naratriptan as needed.     We discussed continuing amitriptyline 50 mg nightly for headache prevention.  -I recommend naratriptan 2.5 mg taken at the onset of headache, with repeat dose in 4 hours if needed.  This should not exceed more than 9 days/month to avoid medication overuse.  She may take this daily during 1 week/month, during her menstrual period.  -For acute treatment of mild headache, she will continue naproxen 440 mg twice daily as needed.  This should not exceed more than 14 days/month to avoid medication overuse.    We discussed cost of naratriptan; " it is more expensive with insurance. GoodRx is helpful; I suggested [email or web address] as another option. She will let me know if a change in pharmacy is needed.    I will see her back in 1 year, sooner if needed.    Catalina Strickland MD  Neurology

## 2024-03-28 ENCOUNTER — TELEPHONE (OUTPATIENT)
Dept: NEUROLOGY | Facility: CLINIC | Age: 44
End: 2024-03-28
Payer: COMMERCIAL

## 2024-03-28 NOTE — TELEPHONE ENCOUNTER
Left Voicemail (1st Attempt) and Sent Mychart (1st Attempt) for the patient to call back and schedule the following:    Appointment type: Return Headache  Provider: Marco A  Return date: Around 3/27/2025  Specialty phone number: [phone]  Additional appointment(s) needed: N/A  Additional Notes: N/A    Osmin To on 3/28/2024 at 4:06 PM

## 2024-03-29 DIAGNOSIS — G43.829 MENSTRUAL MIGRAINE, NOT INTRACTABLE, WITHOUT STATUS MIGRAINOSUS: ICD-10-CM

## 2024-03-29 RX ORDER — AMITRIPTYLINE HYDROCHLORIDE 50 MG/1
50 TABLET ORAL AT BEDTIME
Qty: 90 TABLET | Refills: 3 | Status: SHIPPED | OUTPATIENT
Start: 2024-03-29

## 2024-05-10 ENCOUNTER — LAB REQUISITION (OUTPATIENT)
Dept: LAB | Facility: CLINIC | Age: 44
End: 2024-05-10

## 2024-05-10 DIAGNOSIS — Z13.1 ENCOUNTER FOR SCREENING FOR DIABETES MELLITUS: ICD-10-CM

## 2024-05-10 DIAGNOSIS — Z13.220 ENCOUNTER FOR SCREENING FOR LIPOID DISORDERS: ICD-10-CM

## 2024-05-10 PROCEDURE — 80061 LIPID PANEL: CPT | Performed by: OBSTETRICS & GYNECOLOGY

## 2024-05-10 PROCEDURE — 82947 ASSAY GLUCOSE BLOOD QUANT: CPT | Performed by: OBSTETRICS & GYNECOLOGY

## 2024-05-11 LAB
CHOLEST SERPL-MCNC: 222 MG/DL
FASTING STATUS PATIENT QL REPORTED: YES
FASTING STATUS PATIENT QL REPORTED: YES
GLUCOSE SERPL-MCNC: 89 MG/DL (ref 70–99)
HDLC SERPL-MCNC: 56 MG/DL
LDLC SERPL CALC-MCNC: 151 MG/DL
NONHDLC SERPL-MCNC: 166 MG/DL
TRIGL SERPL-MCNC: 74 MG/DL

## 2024-07-14 ENCOUNTER — HEALTH MAINTENANCE LETTER (OUTPATIENT)
Age: 44
End: 2024-07-14

## 2024-12-26 ENCOUNTER — DOCUMENTATION ONLY (OUTPATIENT)
Dept: NEUROLOGY | Facility: CLINIC | Age: 44
End: 2024-12-26
Payer: COMMERCIAL

## 2024-12-26 DIAGNOSIS — G43.829 MENSTRUAL MIGRAINE, NOT INTRACTABLE, WITHOUT STATUS MIGRAINOSUS: ICD-10-CM

## 2024-12-26 RX ORDER — NARATRIPTAN 2.5 MG/1
2.5 TABLET ORAL
Qty: 18 TABLET | Refills: 11 | Status: SHIPPED | OUTPATIENT
Start: 2024-12-26

## 2024-12-26 NOTE — TELEPHONE ENCOUNTER
Rx Authorization:  Requested Medication/ Dose naratriptan (AMERGE) 2.5 MG tablet   Date last refill ordered: 12/11/23  Quantity ordered: 18  # refills: 11  Date of last clinic visit with ordering provider: 3/27/24  Date of next clinic visit with ordering provider:   All pertinent protocol data (lab date/result):   Include pertinent information from patients message:

## 2025-02-26 DIAGNOSIS — G43.829 MENSTRUAL MIGRAINE, NOT INTRACTABLE, WITHOUT STATUS MIGRAINOSUS: ICD-10-CM

## 2025-02-26 RX ORDER — AMITRIPTYLINE HYDROCHLORIDE 50 MG/1
50 TABLET ORAL AT BEDTIME
Qty: 90 TABLET | Refills: 3 | Status: SHIPPED | OUTPATIENT
Start: 2025-02-26

## 2025-02-26 NOTE — TELEPHONE ENCOUNTER
RX Authorization    Medication: amitriptyline (ELAVIL) 50 MG tablet     Date last refill ordered: 03/29/2024    Quantity ordered: 90     # refills: 3     Date of last clinic visit with ordering provider: 03/27/2024    Date of next clinic visit with ordering provider: 03/17/2025

## 2025-03-12 ASSESSMENT — HEADACHE IMPACT TEST (HIT 6)
HOW OFTEN DID HEADACHS LIMIT CONCENTRATION ON WORK OR DAILY ACTIVITY: RARELY
HOW OFTEN HAVE YOU FELT TOO TIRED TO WORK BECAUSE OF YOUR HEADACHES: NEVER
WHEN YOU HAVE HEADACHES HOW OFTEN IS THE PAIN SEVERE: VERY OFTEN
WHEN YOU HAVE A HEADACHE HOW OFTEN DO YOU WISH YOU COULD LIE DOWN: ALWAYS
HOW OFTEN DO HEADACHES LIMIT YOUR DAILY ACTIVITIES: RARELY
HIT6 TOTAL SCORE: 54
HOW OFTEN HAVE YOU FELT FED UP OR IRRITATED BECAUSE OF YOUR HEADACHES: RARELY

## 2025-03-12 ASSESSMENT — MIGRAINE DISABILITY ASSESSMENT (MIDAS)
TOTAL SCORE: 5
HOW MANY DAYS WAS HOUSEWORK PRODUCTIVITY CUT IN HALF DUE TO HEADACHES: 2
HOW OFTEN WERE SOCIAL ACTIVITIES MISSED DUE TO HEADACHES: 1
ON A SCALE FROM 0-10 ON AVERAGE HOW PAINFUL WERE HEADACHES: 8
HOW MANY DAYS DID YOU MISS WORK OR SCHOOL BECAUSE OF HEADACHES: 0
HOW MANY DAYS DID YOU NOT DO HOUSEWORK BECAUSE OF HEADACHES: 2
HOW MANY DAYS WAS YOUR PRODUCTIVITY CUT IN HALF BECAUSE OF HEADACHES: 0
HOW MANY DAYS IN THE PAST 3 MONTHS HAVE YOU HAD A HEADACHE: 11

## 2025-03-17 ENCOUNTER — OFFICE VISIT (OUTPATIENT)
Dept: NEUROLOGY | Facility: CLINIC | Age: 45
End: 2025-03-17
Payer: COMMERCIAL

## 2025-03-17 VITALS
WEIGHT: 170 LBS | SYSTOLIC BLOOD PRESSURE: 122 MMHG | HEART RATE: 108 BPM | OXYGEN SATURATION: 100 % | DIASTOLIC BLOOD PRESSURE: 78 MMHG | BODY MASS INDEX: 29.18 KG/M2

## 2025-03-17 DIAGNOSIS — G43.829 MENSTRUAL MIGRAINE, NOT INTRACTABLE, WITHOUT STATUS MIGRAINOSUS: Primary | ICD-10-CM

## 2025-03-17 PROCEDURE — G2211 COMPLEX E/M VISIT ADD ON: HCPCS | Performed by: PSYCHIATRY & NEUROLOGY

## 2025-03-17 PROCEDURE — 99214 OFFICE O/P EST MOD 30 MIN: CPT | Performed by: PSYCHIATRY & NEUROLOGY

## 2025-03-17 PROCEDURE — 3074F SYST BP LT 130 MM HG: CPT | Performed by: PSYCHIATRY & NEUROLOGY

## 2025-03-17 PROCEDURE — 3078F DIAST BP <80 MM HG: CPT | Performed by: PSYCHIATRY & NEUROLOGY

## 2025-03-17 RX ORDER — FLUOCINONIDE TOPICAL SOLUTION USP, 0.05% 0.5 MG/ML
SOLUTION TOPICAL
COMMUNITY

## 2025-03-17 RX ORDER — RIZATRIPTAN BENZOATE 10 MG/1
10 TABLET, ORALLY DISINTEGRATING ORAL
Qty: 18 TABLET | Refills: 11 | Status: SHIPPED | OUTPATIENT
Start: 2025-03-17

## 2025-03-17 RX ORDER — AMITRIPTYLINE HYDROCHLORIDE 50 MG/1
50 TABLET ORAL AT BEDTIME
Qty: 90 TABLET | Refills: 3 | Status: SHIPPED | OUTPATIENT
Start: 2025-03-17

## 2025-03-17 RX ORDER — AMITRIPTYLINE HYDROCHLORIDE 50 MG/1
50 TABLET ORAL AT BEDTIME
Qty: 90 TABLET | Refills: 3 | Status: SHIPPED | OUTPATIENT
Start: 2025-03-17 | End: 2025-03-17

## 2025-03-17 RX ORDER — MINOXIDIL 2.5 MG/1
0.5 TABLET ORAL DAILY
COMMUNITY
Start: 2022-02-01

## 2025-03-17 NOTE — PROGRESS NOTES
[facility]    Headache Neurology Progress Note  March 17, 2025      Assessment/Plan:   Chloe Magaña is a 45 year old woman who returns for follow up of episodic migraine without aura, possible menstrual migraine. Currently well controlled with amitriptyline 50 mg nightly, but attacks are not adequately treated with naratriptan as needed; Naratriptan also with side effects.     We discussed continuing amitriptyline 50 mg nightly for headache prevention, monitoring heart rate.  -I recommend rizatriptan 10 mg ODT taken at the onset of headache, with repeat dose in 2 hours if needed.  This should not exceed more than 9 days/month to avoid medication overuse.    -For acute treatment of mild headache, she will continue naproxen 440 mg twice daily as needed.  This should not exceed more than 14 days/month to avoid medication overuse.  -If still getting tightness with rizatriptan, Nurtec, Ubrelvy will be considered.     The longitudinal plan of care for Chloe was addressed during this visit. Due to the added complexity in care, I will continue to support Chloe in the subsequent management of this condition(s) and with the ongoing continuity of care of this condition(s). Return in 1 year via virtual visit; ok to see AREN Au.    Catalina Strickland MD  Neurology     Subjective:    Chloe Magaña returns for follow up of episodic migraine without aura, possible menstrual migraine.    Today, she reports she has 2-4 attacks per month. Feels like attacks are more severe now, not as well treated with naratriptan at onset.    She describes left periorbital and frontal pain. This spreads to back of head. It feels like throbbing. It rates 8/10. An attack lasts over 4 hours if not treated.  She has associated photophobia, phonophobia, nausea, rare vomiting.     She has 2-4/30 severe headache days per month.    She takes amitriptyline 50 mg nightly. No side effects. HR today is 108; phone can monitor  this. Average is 91 per phone.    She uses naproxen or naratriptan as needed. Naratriptan is expensive. Can cause throat tightness.  Sumatriptan in the past caused tightness feeling around throat as well.    Uses an ice cap and cooling patch as needed.    Objective:    Vitals: /78 (BP Location: Right arm, Patient Position: Sitting, Cuff Size: Adult Regular)   Pulse 108   Wt 77.1 kg (170 lb)   SpO2 100%   BMI 29.18 kg/m    General: Cooperative, NAD  Neurologic:  Mental Status: Fully alert, attentive and oriented. Speech clear and fluent.   Cranial Nerves: Facial movements symmetric.   Motor: No abnormal movements.      Pertinent Investigations:            12/8/2023    11:43 AM 3/25/2024     8:13 AM 3/12/2025     9:49 AM   HIT-6   When you have headaches, how often is the pain severe 11 11 11   How often do headaches limit your ability to do usual daily activities including household work, work, school, or social activities? 10 10 8   When you have a headache, how often do you wish you could lie down? 11 13 13   In the past 4 weeks, how often have you felt too tired to do work or daily activities because of your headaches 8 6 6   In the past 4 weeks, how often have you felt fed up or irritated because of your headaches 8 8 8   In the past 4 weeks, how often did headaches limit your ability to concentrate on work or daily activities 10 8 8   HIT-6 Total Score 58 56 54        Patient-reported           12/8/2023    11:48 AM 3/25/2024     8:15 AM 3/12/2025     9:51 AM   MIDAS - in the past three months:   On how many days did you miss work or school because of your headaches? 1 0 0   How many days was your productivity at work or school reduced by half or more because of your headaches? 0 10 0   On how many days did you not do household work because of your headaches? 0 2 2   How many days was your productivity in household work reduced by half or more because of your headaches? 0 10 2   On how many days did  you miss family, social, or leisure activities because of your headaches? 0 1 1   On how many days did you have a headache? 8 10 11   On a scale of 0-10, on average how painful were these headaches? 8 8 8   MIDAS Score 1 (I - Little or No Disability) 23 (IV - Severe Disability) 5 (I - Little or No Disability)

## 2025-03-17 NOTE — LETTER
3/17/2025       RE: Chloe Magaña  [address] [address] [facility] [place] [address]     Dear Colleague,    Thank you for referring your patient, Chloe Magaña, to the [facility]. Please see a copy of my visit note below.    [facility]    Headache Neurology Progress Note  March 17, 2025      Assessment/Plan:   Chloe Magaña is a 45 year old woman who returns for follow up of episodic migraine without aura, possible menstrual migraine. Currently well controlled with amitriptyline 50 mg nightly, but attacks are not adequately treated with naratriptan as needed; Naratriptan also with side effects.     We discussed continuing amitriptyline 50 mg nightly for headache prevention, monitoring heart rate.  -I recommend rizatriptan 10 mg ODT taken at the onset of headache, with repeat dose in 2 hours if needed.  This should not exceed more than 9 days/month to avoid medication overuse.    -For acute treatment of mild headache, she will continue naproxen 440 mg twice daily as needed.  This should not exceed more than 14 days/month to avoid medication overuse.  -If still getting tightness with rizatriptan, Nurtec, Ubrelvy will be considered.     The longitudinal plan of care for Chloe was addressed during this visit. Due to the added complexity in care, I will continue to support Chloe in the subsequent management of this condition(s) and with the ongoing continuity of care of this condition(s). Return in 1 year via virtual visit; ok to see AREN Au.    Catalina Strickland MD  Neurology     Subjective:    Chloe Magaña returns for follow up of episodic migraine without aura, possible menstrual migraine.    Today, she reports she has 2-4 attacks per month. Feels like attacks are more severe now, not as well treated with naratriptan at onset.    She describes left periorbital and frontal pain. This spreads  to back of head. It feels like throbbing. It rates 8/10. An attack lasts over 4 hours if not treated.  She has associated photophobia, phonophobia, nausea, rare vomiting.     She has 2-4/30 severe headache days per month.    She takes amitriptyline 50 mg nightly. No side effects. HR today is 108; phone can monitor this. Average is 91 per phone.    She uses naproxen or naratriptan as needed. Naratriptan is expensive. Can cause throat tightness.  Sumatriptan in the past caused tightness feeling around throat as well.    Uses an ice cap and cooling patch as needed.    Objective:    Vitals: /78 (BP Location: Right arm, Patient Position: Sitting, Cuff Size: Adult Regular)   Pulse 108   Wt 77.1 kg (170 lb)   SpO2 100%   BMI 29.18 kg/m    General: Cooperative, NAD  Neurologic:  Mental Status: Fully alert, attentive and oriented. Speech clear and fluent.   Cranial Nerves: Facial movements symmetric.   Motor: No abnormal movements.      Pertinent Investigations:            12/8/2023    11:43 AM 3/25/2024     8:13 AM 3/12/2025     9:49 AM   HIT-6   When you have headaches, how often is the pain severe 11 11 11   How often do headaches limit your ability to do usual daily activities including household work, work, school, or social activities? 10 10 8   When you have a headache, how often do you wish you could lie down? 11 13 13   In the past 4 weeks, how often have you felt too tired to do work or daily activities because of your headaches 8 6 6   In the past 4 weeks, how often have you felt fed up or irritated because of your headaches 8 8 8   In the past 4 weeks, how often did headaches limit your ability to concentrate on work or daily activities 10 8 8   HIT-6 Total Score 58 56 54        Patient-reported           12/8/2023    11:48 AM 3/25/2024     8:15 AM 3/12/2025     9:51 AM   MIDAS - in the past three months:   On how many days did you miss work or school because of your headaches? 1 0 0   How many days was  your productivity at work or school reduced by half or more because of your headaches? 0 10 0   On how many days did you not do household work because of your headaches? 0 2 2   How many days was your productivity in household work reduced by half or more because of your headaches? 0 10 2   On how many days did you miss family, social, or leisure activities because of your headaches? 0 1 1   On how many days did you have a headache? 8 10 11   On a scale of 0-10, on average how painful were these headaches? 8 8 8   MIDAS Score 1 (I - Little or No Disability) 23 (IV - Severe Disability) 5 (I - Little or No Disability)          Again, thank you for allowing me to participate in the care of your patient.      Sincerely,    Catalina Strickland MD

## 2025-05-12 ENCOUNTER — LAB REQUISITION (OUTPATIENT)
Dept: LAB | Facility: CLINIC | Age: 45
End: 2025-05-12

## 2025-05-12 DIAGNOSIS — Z13.220 ENCOUNTER FOR SCREENING FOR LIPOID DISORDERS: ICD-10-CM

## 2025-05-12 DIAGNOSIS — Z13.1 ENCOUNTER FOR SCREENING FOR DIABETES MELLITUS: ICD-10-CM

## 2025-05-12 PROCEDURE — 82947 ASSAY GLUCOSE BLOOD QUANT: CPT | Performed by: OBSTETRICS & GYNECOLOGY

## 2025-05-12 PROCEDURE — 80061 LIPID PANEL: CPT | Performed by: OBSTETRICS & GYNECOLOGY

## 2025-05-13 LAB
CHOLEST SERPL-MCNC: 226 MG/DL
FASTING STATUS PATIENT QL REPORTED: YES
FASTING STATUS PATIENT QL REPORTED: YES
GLUCOSE SERPL-MCNC: 95 MG/DL (ref 70–99)
HDLC SERPL-MCNC: 57 MG/DL
LDLC SERPL CALC-MCNC: 153 MG/DL
NONHDLC SERPL-MCNC: 169 MG/DL
TRIGL SERPL-MCNC: 78 MG/DL

## 2025-06-14 NOTE — TELEPHONE ENCOUNTER
Left Voicemail (2nd Attempt) for the patient to call back and schedule the following:    Appointment type: Return Headache  Provider: Marco A  Return date: Around 3/27/2025  Specialty phone number: [phone]  Additional appointment(s) needed: N/A  Additional Notes: N/A    Osmin To on 4/1/2024 at 4:55 PM   moderate

## 2025-06-28 ENCOUNTER — HEALTH MAINTENANCE LETTER (OUTPATIENT)
Age: 45
End: 2025-06-28